# Patient Record
Sex: FEMALE | Race: WHITE | NOT HISPANIC OR LATINO | Employment: UNEMPLOYED | URBAN - METROPOLITAN AREA
[De-identification: names, ages, dates, MRNs, and addresses within clinical notes are randomized per-mention and may not be internally consistent; named-entity substitution may affect disease eponyms.]

---

## 2024-02-22 ENCOUNTER — HOSPITAL ENCOUNTER (EMERGENCY)
Facility: HOSPITAL | Age: 69
Discharge: HOME | End: 2024-02-23
Attending: INTERNAL MEDICINE
Payer: MEDICARE

## 2024-02-22 DIAGNOSIS — M25.561 RIGHT KNEE PAIN: ICD-10-CM
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
## 2024-02-22 DIAGNOSIS — M25.521 RIGHT ELBOW PAIN: ICD-10-CM

## 2024-02-22 DIAGNOSIS — T07.XXXA ABRASIONS OF MULTIPLE SITES: ICD-10-CM

## 2024-02-22 PROCEDURE — 99284 EMERGENCY DEPT VISIT MOD MDM: CPT | Mod: 25

## 2024-02-22 PROCEDURE — 12002 RPR S/N/AX/GEN/TRNK2.6-7.5CM: CPT

## 2024-02-22 PROCEDURE — 25000003 PHARM REV CODE 250: Performed by: INTERNAL MEDICINE

## 2024-02-22 RX ORDER — METHOCARBAMOL 500 MG/1
1500 TABLET, FILM COATED ORAL
Status: COMPLETED | OUTPATIENT
Start: 2024-02-22 | End: 2024-02-22

## 2024-02-22 RX ORDER — IBUPROFEN 600 MG/1
600 TABLET ORAL EVERY 8 HOURS PRN
Qty: 30 TABLET | Refills: 0 | Status: SHIPPED | OUTPATIENT
Start: 2024-02-22

## 2024-02-22 RX ORDER — IBUPROFEN 400 MG/1
800 TABLET ORAL
Status: COMPLETED | OUTPATIENT
Start: 2024-02-22 | End: 2024-02-22

## 2024-02-22 RX ADMIN — IBUPROFEN 800 MG: 400 TABLET ORAL at 10:02

## 2024-02-22 RX ADMIN — METHOCARBAMOL 1500 MG: 500 TABLET ORAL at 10:02

## 2024-02-23 VITALS
BODY MASS INDEX: 27.28 KG/M2 | HEART RATE: 88 BPM | TEMPERATURE: 98 F | HEIGHT: 68 IN | RESPIRATION RATE: 18 BRPM | WEIGHT: 180 LBS | DIASTOLIC BLOOD PRESSURE: 82 MMHG | SYSTOLIC BLOOD PRESSURE: 172 MMHG | OXYGEN SATURATION: 99 %

## 2024-02-23 NOTE — ED NOTES
Pt c/o r elbow anr R knee pain after trip and fall from curb. Pt denies head/ neck/ back pain or trauma. No obvious deformity. +CSM distally to both extremities. Edema noted to R elbow and R knee.     Review of patient's allergies indicates:   Allergen Reactions    Codeine Itching and Swelling    Sulfa (sulfonamide antibiotics) Itching and Swelling        Patient has verified the spelling of their name and  on armband.   APPEARANCE: Patient is alert, calm, oriented x 4, and does not appear distressed.  SKIN: Skin is normal for race, warm, and dry. Normal skin turgor and mucous membranes moist.  CARDIAC: Normal rate and rhythm, no murmur heard.   RESPIRATORY:Normal rate and effort. Breath sounds clear bilaterally throughout chest. Respirations are equal and unlabored.    GASTRO: Bowel sounds normal, abdomen is soft, no tenderness, and no abdominal distention.  MUSCLE: Full ROM. No bony tenderness or soft tissue tenderness. No obvious deformity.  PERIPHERAL VASCULAR: peripheral pulses present. Normal cap refill. No edema. Warm to touch.  NEURO: 5/5 strength major flexors/extensors bilaterally. Sensory intact to light touch bilaterally. [place] coma scale: eyes open spontaneously-4, oriented & converses-5, obeys commands-6. No neurological abnormalities.   MENTAL STATUS: awake, alert and aware of environment.  : Voids without complication      ED orders in progress. Pt SR up x 2. Bed in lowest position with wheels locked. Call bell within reach of pt.

## 2024-02-23 NOTE — ED PROVIDER NOTES
Encounter Date: 2/22/2024    SCRIBE #1 NOTE: ISonny, am scribing for, and in the presence of,  Scooby Browning MD.       History     Chief Complaint   Patient presents with    Fall     Pt reports tripping over curb and falling onto cement tonight; pt has jagged laceration to RIGHT elbow (bleeding controlled, new dressing placed), swelling/pain to RIGHT knee, & abrasion to left palm; denies LOC, hitting head, or blood thinner use; ice pack in place to right knee area    Laceration     Shaila Anaya is a 68 y.o. female with no known PMHx, who presents to the ED for evaluation of right elbow injury s/p fall tonight. Patient reports tripping over a curb and falling onto cement, and notes a wound to her right elbow, pain to her right knee, and a wound to her right hand. She denies head trauma. Denies compliance with anticoagulants. No medications taken PTA. No alleviating or exacerbating factors noted. Denies syncope, numbness, weakness, N/V, or other associated symptoms.    The history is provided by the patient. No  was used.     Review of patient's allergies indicates:   Allergen Reactions    Codeine Itching and Swelling    Sulfa (sulfonamide antibiotics) Itching and Swelling     No past medical history on file.  No past surgical history on file.  No family history on file.     Review of Systems   Constitutional:  Negative for fever.   HENT:  Negative for sore throat.    Respiratory:  Negative for shortness of breath.    Cardiovascular:  Negative for chest pain.   Gastrointestinal:  Negative for abdominal pain, diarrhea, nausea and vomiting.   Genitourinary:  Negative for dysuria.   Musculoskeletal:  Positive for arthralgias. Negative for back pain.   Skin:  Positive for wound. Negative for rash.   Neurological:  Negative for weakness and headaches.   Psychiatric/Behavioral:  Negative for behavioral problems.    All other systems reviewed and are negative.      Physical Exam     Initial  Vitals [02/22/24 2055]   BP Pulse Resp Temp SpO2   (!) 188/97 92 20 98.1 °F (36.7 °C) 95 %      MAP       --         Physical Exam    Nursing note and vitals reviewed.  Constitutional: She appears well-developed and well-nourished.   HENT:   Head: Normocephalic and atraumatic.   Eyes: Conjunctivae are normal.   Neck: Neck supple.   Normal range of motion.  Cardiovascular:  Normal rate, regular rhythm and normal heart sounds.     Exam reveals no gallop and no friction rub.       No murmur heard.  Pulmonary/Chest: Breath sounds normal. No respiratory distress. She has no wheezes. She has no rhonchi. She has no rales.   Abdominal: Abdomen is soft. There is no abdominal tenderness.   Musculoskeletal:         General: Normal range of motion.      Cervical back: Normal range of motion and neck supple.      Comments: Right knee ecchymosis with significant edema, pain upon movement and tenderness to palpation. No gross deformities. BLE neurovascularly intact.      Neurological: She is alert and oriented to person, place, and time. GCS score is 15. GCS eye subscore is 4. GCS verbal subscore is 5. GCS motor subscore is 6.   Skin: Skin is warm and dry.   Abrasion to the right elbow with a small skin flap of approximately 4 cm in diameter.   Small superficial abrasions to bilateral palms.    Psychiatric: She has a normal mood and affect.         ED Course   Lac Repair    Date/Time: 2/22/2024 11:48 PM    Performed by: Scooby Browning MD  Authorized by: Scooby Browning MD    Consent:     Consent obtained:  Verbal    Consent given by:  Patient    Risks discussed:  Infection, need for additional repair, nerve damage and poor cosmetic result  Universal protocol:     Procedure explained and questions answered to patient or proxy's satisfaction: yes      Test results available: yes      Imaging studies available: yes    Anesthesia:     Anesthesia method:  None  Laceration details:     Location: Abrasion to right elbow with small  skin flap.  Pre-procedure details:     Preparation:  Patient was prepped and draped in usual sterile fashion and imaging obtained to evaluate for foreign bodies  Exploration:     Hemostasis achieved with:  Direct pressure    Imaging obtained: x-ray      Imaging outcome: foreign body not noted      Wound exploration: wound explored through full range of motion      Wound extent: no foreign bodies/material noted, no muscle damage noted, no nerve damage noted and no tendon damage noted      Contaminated: no    Treatment:     Area cleansed with:  Povidone-iodine and saline    Amount of cleaning:  Extensive    Undermining:  None  Skin repair:     Repair method:  Tissue adhesive  Approximation:     Approximation:  Loose  Repair type:     Repair type:  Simple  Post-procedure details:     Dressing:  Sterile dressing    Procedure completion:  Tolerated well, no immediate complications    Labs Reviewed - No data to display       Imaging Results              X-Ray Knee 3 View Right (Final result)  Result time 02/22/24 23:14:00      Final result by Magno Chow MD (02/22/24 23:14:00)                   Impression:      No acute osseous abnormality identified.      Electronically signed by: Magno Chow MD  Date:    02/22/2024  Time:    23:14               Narrative:    EXAMINATION:  XR KNEE 3 VIEW RIGHT    CLINICAL HISTORY:  Pain in right knee    TECHNIQUE:  AP, lateral, and Merchant views of the right knee were performed.    COMPARISON:  None    FINDINGS:  No evidence of acute displaced fracture, dislocation, or osseous destructive process.  Joint spaces are preserved.  No significant suprapatellar joint effusion.  Soft tissue swelling and edema are seen about the knee.                                       X-Ray Elbow Complete Right (Final result)  Result time 02/22/24 23:13:20      Final result by Magno Chow MD (02/22/24 23:13:20)                   Impression:      No acute displaced fracture  seen.      Electronically signed by: Magno Chow MD  Date:    02/22/2024  Time:    23:13               Narrative:    EXAMINATION:  XR ELBOW COMPLETE 3 VIEW RIGHT    CLINICAL HISTORY:  . Pain in right elbow    TECHNIQUE:  AP, lateral, and oblique views of the right elbow were performed.    COMPARISON:  None    FINDINGS:  No evidence of acute displaced fracture, dislocation, or osseous destructive process.  No significant elbow joint effusion.                                       Medications   ibuprofen tablet 800 mg (800 mg Oral Given 2/22/24 2245)   methocarbamoL tablet 1,500 mg (1,500 mg Oral Given 2/22/24 2247)     Medical Decision Making  Shaila Anaya is a 68 y.o. female with no known PMHx, who presents to the ED for evaluation of right elbow injury s/p fall tonight. Patient reports tripping over a curb and falling onto cement, and notes a wound to her right elbow, pain to her right knee, and a wound to her right hand. She denies head trauma. Denies compliance with anticoagulants. No medications taken PTA. No alleviating or exacerbating factors noted. Denies syncope, numbness, weakness, N/V, or other associated symptoms.  Course of ED stay:   X-rays of right knee and elbow reveal no acute abnormalities.  Patient tolerated wound repair with tissue glue and received Ace wrap to the right knee as well as ice pack.  Instructions for wound care, fall precautions and contusions were given prior to discharge as well as a prescription for ibuprofen.  Patient was advised to follow-up with her primary care physician within the next week for re-evaluation/return to the emergency department if condition worsens.    Amount and/or Complexity of Data Reviewed  Radiology: ordered.    Risk  Prescription drug management.            Scribe Attestation:   Scribe #1: I performed the above scribed service and the documentation accurately describes the services I performed. I attest to the accuracy of the note.                              This document was produced by a scribe under my direction and in my presence. I agree with the content of the note and have made any necessary edits.     Dr. Browning    02/22/2024 11:50 PM      Clinical Impression:  Final diagnoses:  [M25.521] Right elbow pain  [M25.561] Right knee pain  [W19.XXXA] Fall, initial encounter (Primary)  [T07.XXXA] Abrasions of multiple sites                 Scooby Browning MD  02/22/24 7068

## 2024-02-25 ENCOUNTER — OFFICE VISIT (OUTPATIENT)
Dept: URGENT CARE | Facility: CLINIC | Age: 69
End: 2024-02-25
Payer: MEDICARE

## 2024-02-25 VITALS
SYSTOLIC BLOOD PRESSURE: 182 MMHG | HEIGHT: 68 IN | RESPIRATION RATE: 18 BRPM | BODY MASS INDEX: 27.28 KG/M2 | TEMPERATURE: 98 F | HEART RATE: 107 BPM | DIASTOLIC BLOOD PRESSURE: 99 MMHG | WEIGHT: 180 LBS | OXYGEN SATURATION: 98 %

## 2024-02-25 DIAGNOSIS — S50.311D ABRASION OF RIGHT ELBOW, SUBSEQUENT ENCOUNTER: ICD-10-CM

## 2024-02-25 DIAGNOSIS — S80.01XD CONTUSION OF RIGHT KNEE, SUBSEQUENT ENCOUNTER: ICD-10-CM

## 2024-02-25 DIAGNOSIS — L08.9 POST-TRAUMATIC WOUND INFECTION: ICD-10-CM

## 2024-02-25 DIAGNOSIS — T14.8XXA POST-TRAUMATIC WOUND INFECTION: ICD-10-CM
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
## 2024-02-25 PROCEDURE — 99203 OFFICE O/P NEW LOW 30 MIN: CPT | Mod: S$GLB,,, | Performed by: NURSE PRACTITIONER

## 2024-02-25 RX ORDER — CEPHALEXIN 500 MG/1
500 CAPSULE ORAL 4 TIMES DAILY
Qty: 40 CAPSULE | Refills: 0 | Status: SHIPPED | OUTPATIENT
Start: 2024-02-25 | End: 2024-03-06

## 2024-02-25 NOTE — PATIENT INSTRUCTIONS
Fall, subsequent encounter  Contusion of right knee, subsequent encounter    Ace wrap applied    Abrasion of right elbow, subsequent encounter  Post-traumatic wound infection    Dressing changed, Ace wrap applied, and sling provided      Post-traumatic wound infection    -     cephALEXin (KEFLEX) 500 MG capsule; Take 1 capsule (500 mg total) by mouth 4 (four) times daily. for 10 days  Dispense: 40 capsule; Refill: 0      - Ibuprofen as directed as needed for fever/pain.  Motrin/Ibuprofen 600-800 mg every 6-8 hours for pain and inflammation.  Do not take ibuprofen if you have a history of GI bleeding, kidney disease, or if you take blood   Patient understands to take with food and/or OTC prilosec to decrease GI side effects.     - Tylenol as directed as needed for pain. Tylenol/acetaminophen 650-1000 mg every 6-8 hours for added pain relief. Avoid tylenol if you have a history of liver disease.       Rest - Rest the injured area   Ace wrap and Sling provided     Ice - Apply ice  to affected area for the first 24-48 hours.  Ice compress to the affected area 2-3x a day for 15-20 minutes as needed for pain management. (DO NOT APPLY ICE DIRECTLY TO THE SKIN.  DO NOT LEAVE ON AFFECTED BODY PART FOR MORE THAN 20 MINUTES AT AT TIME TO AVOID INJURY TO SOFT TISSUE)      Compression - Wear ACE wrap or splint provided for compression and comfort to help reduce pain and swelling     Elevate - Elevated affected area higher than your heart to reduce swelling          Follow up with your PCP in 1 week or as needed if no improvement.      If your condition worsens or fails to improve we recommend that you receive another evaluation at the ER immediately or contact your PCP to discuss your concerns or return here.      Wound Care    Gently clean wound with soap and water twice daily.    Do not let thick, dark, adherent crust form since this may delay healing.  If a crust develops, use Q-tip and hydrogen peroxide diluted 1 to 1 with  tap water to gently break it up.  Never pour/soak wound in hydrogen peroxide.    Cover with non-stick dressing (like Telfa, not regular gauze) and tape.      Keep wound covered until well-healed.    Signs of Infection:  Increase in redness (In initial stages, redness is normal at the wound edge but should not continue to increase)  Increase in pain and swelling (Similar to redness, pain and swelling is a normal part of healing, but should not continue or worsen after 3-5 days)  Yellowish drainage or fever after a few days    Itching is normal part of the healing process.  If severe or redness and water blisters develop, you may be allergic to ointment or tape.      Scar Care: * If Keloid history or suspected see Dermatology ASAP  Use sunscreen if sun exposure to decrease scarring  Once healed, use silicone sheeting nightly for 1-2 months

## 2024-02-25 NOTE — PROGRESS NOTES
"Subjective:      Patient ID: Shaila Anaya is a 68 y.o. female.    Vitals:  height is 5' 8" (1.727 m) and weight is 81.6 kg (180 lb). Her oral temperature is 98.2 °F (36.8 °C). Her blood pressure is 182/99 (abnormal) and her pulse is 107. Her respiration is 18 and oxygen saturation is 98%.     Chief Complaint: Wound Check      Pt is a 67 yo female presenting with right elbow wound that may be infected.  Initial injury occurred when pt fell onto cement on 2/22/24 (3 days ago).  Pt was treated in the emergency dept and skin tear was glued shut..    Wound Check  She was originally treated 2 to 3 days ago. Previous treatment included laceration repair (glue). There has been bloody discharge from the wound. The redness has worsened. The swelling has worsened. The pain has worsened. There is difficulty moving the extremity or digit due to pain.       Constitution: Negative for chills, fatigue and fever.   Cardiovascular:  Negative for chest pain, leg swelling, palpitations and sob on exertion.   Respiratory:  Negative for chest tightness and shortness of breath.    Gastrointestinal:  Negative for nausea, vomiting and diarrhea.   Skin:  Positive for wound (right elbow).   Neurological:  Negative for headaches.      Objective:     Physical Exam   Constitutional: She is oriented to person, place, and time.   Cardiovascular: Normal rate and regular rhythm.   Pulmonary/Chest: Effort normal and breath sounds normal.   Neurological: She is alert and oriented to person, place, and time.   Skin: Skin is warm and dry.         Comments: Right elbow has ~ 4 cm x 3 cm wound with erythema, edema, warm to touch, and serosanguinous drainage.  Right lateral knee has edema and ecchymoses   Vitals reviewed.      Assessment:     1. Fall, subsequent encounter    2. Abrasion of right elbow, subsequent encounter    3. Post-traumatic wound infection    4. Contusion of right knee, subsequent encounter        Plan:       Fall, subsequent " encounter    Abrasion of right elbow, subsequent encounter  -     SLING FOR HOME USE  -     cephALEXin (KEFLEX) 500 MG capsule; Take 1 capsule (500 mg total) by mouth 4 (four) times daily. for 10 days  Dispense: 40 capsule; Refill: 0    Post-traumatic wound infection  -     cephALEXin (KEFLEX) 500 MG capsule; Take 1 capsule (500 mg total) by mouth 4 (four) times daily. for 10 days  Dispense: 40 capsule; Refill: 0    Contusion of right knee, subsequent encounter      Patient Instructions   Fall, subsequent encounter  Contusion of right knee, subsequent encounter    Ace wrap applied    Abrasion of right elbow, subsequent encounter  Post-traumatic wound infection    Dressing changed, Ace wrap applied, and sling provided      Post-traumatic wound infection    -     cephALEXin (KEFLEX) 500 MG capsule; Take 1 capsule (500 mg total) by mouth 4 (four) times daily. for 10 days  Dispense: 40 capsule; Refill: 0      - Ibuprofen as directed as needed for fever/pain.  Motrin/Ibuprofen 600-800 mg every 6-8 hours for pain and inflammation.  Do not take ibuprofen if you have a history of GI bleeding, kidney disease, or if you take blood   Patient understands to take with food and/or OTC prilosec to decrease GI side effects.     - Tylenol as directed as needed for pain. Tylenol/acetaminophen 650-1000 mg every 6-8 hours for added pain relief. Avoid tylenol if you have a history of liver disease.       Rest - Rest the injured area   Ace wrap and Sling provided     Ice - Apply ice  to affected area for the first 24-48 hours.  Ice compress to the affected area 2-3x a day for 15-20 minutes as needed for pain management. (DO NOT APPLY ICE DIRECTLY TO THE SKIN.  DO NOT LEAVE ON AFFECTED BODY PART FOR MORE THAN 20 MINUTES AT AT TIME TO AVOID INJURY TO SOFT TISSUE)      Compression - Wear ACE wrap or splint provided for compression and comfort to help reduce pain and swelling     Elevate - Elevated affected area higher than your heart to  reduce swelling          Follow up with your PCP in 1 week or as needed if no improvement.      If your condition worsens or fails to improve we recommend that you receive another evaluation at the ER immediately or contact your PCP to discuss your concerns or return here.      Wound Care    Gently clean wound with soap and water twice daily.    Do not let thick, dark, adherent crust form since this may delay healing.  If a crust develops, use Q-tip and hydrogen peroxide diluted 1 to 1 with tap water to gently break it up.  Never pour/soak wound in hydrogen peroxide.    Cover with non-stick dressing (like Telfa, not regular gauze) and tape.      Keep wound covered until well-healed.    Signs of Infection:  Increase in redness (In initial stages, redness is normal at the wound edge but should not continue to increase)  Increase in pain and swelling (Similar to redness, pain and swelling is a normal part of healing, but should not continue or worsen after 3-5 days)  Yellowish drainage or fever after a few days    Itching is normal part of the healing process.  If severe or redness and water blisters develop, you may be allergic to ointment or tape.      Scar Care: * If Keloid history or suspected see Dermatology ASAP  Use sunscreen if sun exposure to decrease scarring  Once healed, use silicone sheeting nightly for 1-2 months